# Patient Record
(demographics unavailable — no encounter records)

---

## 2017-04-17 NOTE — ED PDOC
HPI: General Adult


Time Seen by Provider: 17 07:41


Chief Complaint (Nursing): Abdominal Pain


Chief Complaint (Provider): abdominal pain


History Per: Patient


History/Exam Limitations: no limitations


Additional Complaint(s): 


54yo female complaining of abdominal pain, vomit, diarrhea since 0100 last 

night. Also reports chills, body aches, denies bleeding or fever








PMD: clinic





Past Medical History


Reviewed: Historical Data, Nursing Documentation, Vital Signs


Vital Signs: 


 Last Vital Signs











Temp  98.8 F   17 07:30


 


Pulse  78   17 07:30


 


Resp  20   17 07:30


 


BP  119/55 L  17 07:30


 


Pulse Ox  98   17 07:53














- Medical History


PMH: Depression, Hyperlipidemia





- Surgical History


Surgical History: Cholecystectomy





- Family History


Family History: States: Unknown Family Hx





- Living Arrangements


Living Arrangements: With Family





- Home Medications


Home Medications: 


 Ambulatory Orders











 Medication  Instructions  Recorded


 


MetFORMIN [glucoPHAGE] 1 tab PO DAILY 17


 


Ciprofloxacin HCl [Cipro] 500 mg PO BID #20 tab 17


 


Dicyclomine [Dicyclomine HCl] 10 mg PO TID #10 cap 17


 


Metronidazole [Flagyl] 500 mg PO TID #30 tablet 17














- Allergies


Allergies/Adverse Reactions: 


 Allergies











Allergy/AdvReac Type Severity Reaction Status Date / Time


 


No Known Allergies Allergy   Verified 17 11:24














Review of Systems


ROS Statement: Except As Marked, All Systems Reviewed And Found Negative


Constitutional: Positive for: Chills.  Negative for: Fever


Gastrointestinal: Positive for: Nausea, Vomiting, Abdominal Pain, Diarrhea





Physical Exam





- Reviewed


Nursing Documentation Reviewed: Yes


Vital Signs Reviewed: Yes





- Physical Exam


Appears: Positive for: Well, Non-toxic, No Acute Distress


Head Exam: Positive for: ATRAUMATIC, NORMAL INSPECTION, NORMOCEPHALIC


Skin: Positive for: Warm, Dry


Eye Exam: Positive for: EOMI, PERRL


ENT: Positive for: Other (moist mucous membranes)


Cardiovascular/Chest: Positive for: Regular Rate, Rhythm


Respiratory: Positive for: Normal Breath Sounds.  Negative for: Rales, Rhonchi, 

Wheezing


Gastrointestinal/Abdominal: Positive for: Tenderness (periumbilical)





- Laboratory Results


Result Diagrams: 


 17 08:38





 17 08:38





- ECG


O2 Sat by Pulse Oximetry: 98 (RA)


Pulse Ox Interpretation: Normal





Medical Decision Making


Medical Decision Makin:


CT abd/pel w/, labs, blood culture, morphine, IV fluids, zofran ordered. 





Disposition





- Clinical Impression


Clinical Impression: 


 Enteritis





- Patient ED Disposition


Is Patient to be Admitted: No


Counseled Patient/Family Regarding: Studies Performed, Diagnosis, Need For 

Followup, Rx Given





- Disposition


Referrals: 


[facility] [Outside]


Disposition: Routine/Home


Disposition Time: 12:15


Condition: FAIR


Prescriptions: 


Dicyclomine [Dicyclomine HCl] 10 mg PO TID #10 cap


Ciprofloxacin HCl [Cipro] 500 mg PO BID #20 tab


Metronidazole [Flagyl] 500 mg PO TID #30 tablet


Instructions:  Enteritis (ED)





Additional Comments





- Additional Comments


Additional Comments: 


Scribe Attestation:


Documented by Lucio Braden acting as a scribe for Rhys Barkley MD.





Provider Scribe Attestation:


All medical record entries made by the Scribe were at my direction and 

personally dictated by me. I have reviewed the chart and agree that the record 

accurately reflects my personal performance of the history, physical exam, 

medical decision making, and the department course for this patient. I have 

also personally directed, reviewed, and agree with the discharge instructions 

and disposition.

## 2018-11-06 NOTE — CT
PROCEDURE:  CT Abdomen and Pelvis with oral and  IV contrast.



HISTORY:

abd pain



COMPARISON:

Images from CT abdomen and pelvis with contrast performed 4/8/11



TECHNIQUE:

Contiguous axial images of the abdomen and pelvis. Oral and IV 

contrast was administered. Coronal and Sagittal reformats generated 

and reviewed. 



Contrast dose: Omnipaque 300



Radiation dose:



Total exam DLP = 769.00 mGy-cm.



This CT exam was performed using one or more of the following dose 

reduction techniques: Automated exposure control, adjustment of the 

mA and/or kV according to patient size, and/or use of iterative 

reconstruction technique.



FINDINGS:





LOWER THORAX:

Bibasilar atelectasis. There is no visible pleural effusion or 

pneumothorax. 



LIVER:

Mild hypoattenuation of the liver may reflect hepatic steatosis.



GALLBLADDER AND BILE DUCTS:

Cholecystectomy. 



PANCREAS:

Unremarkable. 



SPLEEN:

1.4 cm probable splenule. Otherwise unremarkable. 



ADRENALS:

Unremarkable. 



KIDNEYS AND URETERS:

The kidneys enhance symmetrically. No hydronephrosis or obstructing 

renal calculus. 



BLADDER:

The urinary bladder appears unremarkable.



REPRODUCTIVE:

Uterus is present. 



APPENDIX:

The appendix appears within normal limits of caliber. No secondary 

signs of acute appendicitis.



BOWEL:

The stomach is nondistended. 



Small bowel wall thickening to the level of the terminal ileum with a 

somewhat irregular appearance. Oral contrast is seen to extend into 

the colon. 



PERITONEUM:

No significant free fluid. No definite free air.



LYMPH NODES:

No bulky lymphadenopathy identified.



VASCULATURE:

No aortic aneurysm. 



BONES:

No acute osseous abnormality is detected.



OTHER FINDINGS:

None. 



IMPRESSION:

Small bowel wall thickening mostly ileum to the level of the terminal 

ileum with a somewhat irregular appearance. Correlate for infectious 

or inflammatory enteritis. Recommend clinical correlation. 



Additional incidental findings as above. Problem: KNOWLEDGE DEFICIT  Goal: Patient/S.O. demonstrates understanding of disease process, treatment plan, medications, and discharge instructions.   Outcome: Met This Shift